# Patient Record
Sex: FEMALE | Race: BLACK OR AFRICAN AMERICAN
[De-identification: names, ages, dates, MRNs, and addresses within clinical notes are randomized per-mention and may not be internally consistent; named-entity substitution may affect disease eponyms.]

---

## 2016-08-07 VITALS — SYSTOLIC BLOOD PRESSURE: 179 MMHG | DIASTOLIC BLOOD PRESSURE: 122 MMHG

## 2021-12-29 ENCOUNTER — HOSPITAL ENCOUNTER (OUTPATIENT)
Dept: HOSPITAL 61 - KCIC MAMMO | Age: 66
End: 2021-12-29
Attending: FAMILY MEDICINE
Payer: COMMERCIAL

## 2021-12-29 DIAGNOSIS — Z12.31: Primary | ICD-10-CM

## 2021-12-29 PROCEDURE — 77063 BREAST TOMOSYNTHESIS BI: CPT

## 2021-12-29 PROCEDURE — 77067 SCR MAMMO BI INCL CAD: CPT

## 2021-12-29 NOTE — [facility]
Bilateral digital screening mammograms with 3-D tomosynthesis:



Reason for examination: Routine screening.



Comparison is made to previous studies dated back to 12/27/2016.



Bilateral mammograms in CC and oblique projections were obtained with 2-D imaging and 3-D tomosynthes
is imaging on a Siemens Inspiration unit and reviewed on the workstation. Interpretation was made wit
h the benefit of CAD.



The skin and nipples show no abnormalities. No abnormal axillary lymph nodes are seen. The breast par
enchyma shows scattered fatty and fibroglandular density. (Breast density: Category B.) There continu
e to be small nodular densities bilaterally which are stable. There are no new dominant masses, suspi
cious calcifications or architectural distortion. 



Impression:



No evidence of malignancy. Recommend routine screening.



BI-RAD Category 2: Benign.



"Our facility is accredited by the [employer] Mammography Program."



This patient's information has been entered into a reminder system for the patient to be notified wit
h the results of her examination and a target date for the next mammogram.



Electronically signed by: Ava Coleman MD (12/29/2021 1:36 PM) [id number]